# Patient Record
Sex: FEMALE | URBAN - NONMETROPOLITAN AREA
[De-identification: names, ages, dates, MRNs, and addresses within clinical notes are randomized per-mention and may not be internally consistent; named-entity substitution may affect disease eponyms.]

---

## 2019-10-31 ENCOUNTER — APPOINTMENT (OUTPATIENT)
Age: 39
Setting detail: DERMATOLOGY
End: 2019-10-31

## 2019-10-31 DIAGNOSIS — L57.0 ACTINIC KERATOSIS: ICD-10-CM

## 2019-10-31 PROCEDURE — 17000 DESTRUCT PREMALG LESION: CPT

## 2019-10-31 PROCEDURE — OTHER LIQUID NITROGEN: OTHER

## 2019-10-31 ASSESSMENT — TOTAL NUMBER OF LESIONS: # OF LESIONS?: 1

## 2019-10-31 ASSESSMENT — LOCATION SIMPLE DESCRIPTION DERM: LOCATION SIMPLE: LEFT UPPER BACK

## 2019-10-31 ASSESSMENT — LOCATION DETAILED DESCRIPTION DERM: LOCATION DETAILED: LEFT SUPERIOR MEDIAL UPPER BACK

## 2019-10-31 ASSESSMENT — LOCATION ZONE DERM: LOCATION ZONE: TRUNK

## 2022-02-28 NOTE — HPI: SKIN LESION
CD[facility] Encounter:    Currently on MDI    Tresiba 45 units 7p ( not missing dosing)     Humalog via T1D1 italo   Target 120  ICR 15  ISF 30    2 injections of Humalog a day, sometimes only treats for carbs. Need education appt for rEin Montes. She reports that did not know a lot of the basics that were gone over. Educations completed on   4 blood sugar checks per day  Treatment of low blood sugar and then needing insulin for food after low if meals.    Rotation of insulin injections  Storage of insulin    Will send numbers to [facility] for Tandem pump    Asked that she put Liliana back on to get blood sugars reading    Time spent with family 1 hour
How Severe Is Your Skin Lesion?: mild
Has Your Skin Lesion Been Treated?: not been treated
Is This A New Presentation, Or A Follow-Up?: Mole